# Patient Record
Sex: MALE | Race: BLACK OR AFRICAN AMERICAN | NOT HISPANIC OR LATINO | URBAN - METROPOLITAN AREA
[De-identification: names, ages, dates, MRNs, and addresses within clinical notes are randomized per-mention and may not be internally consistent; named-entity substitution may affect disease eponyms.]

---

## 2022-07-02 ENCOUNTER — EMERGENCY (EMERGENCY)
Facility: HOSPITAL | Age: 32
LOS: 0 days | Discharge: HOME | End: 2022-07-03
Attending: STUDENT IN AN ORGANIZED HEALTH CARE EDUCATION/TRAINING PROGRAM

## 2022-07-02 VITALS
SYSTOLIC BLOOD PRESSURE: 133 MMHG | OXYGEN SATURATION: 97 % | HEIGHT: 72 IN | WEIGHT: 203.93 LBS | RESPIRATION RATE: 17 BRPM | TEMPERATURE: 99 F | HEART RATE: 75 BPM | DIASTOLIC BLOOD PRESSURE: 81 MMHG

## 2022-07-02 DIAGNOSIS — R20.2 PARESTHESIA OF SKIN: ICD-10-CM

## 2022-07-02 DIAGNOSIS — M54.50 LOW BACK PAIN, UNSPECIFIED: ICD-10-CM

## 2022-07-02 DIAGNOSIS — R26.2 DIFFICULTY IN WALKING, NOT ELSEWHERE CLASSIFIED: ICD-10-CM

## 2022-07-02 PROCEDURE — 99053 MED SERV 10PM-8AM 24 HR FAC: CPT

## 2022-07-02 PROCEDURE — 99285 EMERGENCY DEPT VISIT HI MDM: CPT

## 2022-07-03 VITALS
TEMPERATURE: 98 F | HEART RATE: 58 BPM | DIASTOLIC BLOOD PRESSURE: 84 MMHG | RESPIRATION RATE: 19 BRPM | SYSTOLIC BLOOD PRESSURE: 135 MMHG | OXYGEN SATURATION: 100 %

## 2022-07-03 PROCEDURE — 72131 CT LUMBAR SPINE W/O DYE: CPT | Mod: 26,MC

## 2022-07-03 PROCEDURE — 72128 CT CHEST SPINE W/O DYE: CPT | Mod: 26,MC

## 2022-07-03 RX ORDER — MORPHINE SULFATE 50 MG/1
4 CAPSULE, EXTENDED RELEASE ORAL ONCE
Refills: 0 | Status: DISCONTINUED | OUTPATIENT
Start: 2022-07-03 | End: 2022-07-03

## 2022-07-03 RX ORDER — METHOCARBAMOL 500 MG/1
750 TABLET, FILM COATED ORAL ONCE
Refills: 0 | Status: COMPLETED | OUTPATIENT
Start: 2022-07-03 | End: 2022-07-03

## 2022-07-03 RX ORDER — DEXAMETHASONE 0.5 MG/5ML
6 ELIXIR ORAL ONCE
Refills: 0 | Status: COMPLETED | OUTPATIENT
Start: 2022-07-03 | End: 2022-07-03

## 2022-07-03 RX ORDER — IBUPROFEN 200 MG
1 TABLET ORAL
Qty: 30 | Refills: 0
Start: 2022-07-03 | End: 2022-07-07

## 2022-07-03 RX ORDER — LIDOCAINE 4 G/100G
1 CREAM TOPICAL ONCE
Refills: 0 | Status: COMPLETED | OUTPATIENT
Start: 2022-07-03 | End: 2022-07-03

## 2022-07-03 RX ORDER — KETOROLAC TROMETHAMINE 30 MG/ML
15 SYRINGE (ML) INJECTION ONCE
Refills: 0 | Status: DISCONTINUED | OUTPATIENT
Start: 2022-07-03 | End: 2022-07-03

## 2022-07-03 RX ORDER — METHOCARBAMOL 500 MG/1
1 TABLET, FILM COATED ORAL
Qty: 15 | Refills: 0
Start: 2022-07-03 | End: 2022-07-07

## 2022-07-03 RX ORDER — DIAZEPAM 5 MG
5 TABLET ORAL ONCE
Refills: 0 | Status: DISCONTINUED | OUTPATIENT
Start: 2022-07-03 | End: 2022-07-03

## 2022-07-03 RX ADMIN — LIDOCAINE 1 PATCH: 4 CREAM TOPICAL at 03:02

## 2022-07-03 RX ADMIN — Medication 15 MILLIGRAM(S): at 06:45

## 2022-07-03 RX ADMIN — MORPHINE SULFATE 4 MILLIGRAM(S): 50 CAPSULE, EXTENDED RELEASE ORAL at 04:29

## 2022-07-03 RX ADMIN — METHOCARBAMOL 750 MILLIGRAM(S): 500 TABLET, FILM COATED ORAL at 02:59

## 2022-07-03 RX ADMIN — Medication 15 MILLIGRAM(S): at 02:56

## 2022-07-03 RX ADMIN — MORPHINE SULFATE 4 MILLIGRAM(S): 50 CAPSULE, EXTENDED RELEASE ORAL at 06:45

## 2022-07-03 RX ADMIN — Medication 5 MILLIGRAM(S): at 06:55

## 2022-07-03 RX ADMIN — Medication 6 MILLIGRAM(S): at 02:57

## 2022-07-03 NOTE — ED ADULT NURSE REASSESSMENT NOTE - NS ED NURSE REASSESS COMMENT FT1
See Scanned Documents.  
patient verbalized feeling way better, able to stand up and walk small steps. dc instructions explained, patient denies driving, significant other at bedside

## 2022-07-03 NOTE — ED PROVIDER NOTE - PATIENT PORTAL LINK FT
You can access the FollowMyHealth Patient Portal offered by [facility] by registering at the following website: [email or web address]. By joining [employer]’s FollowMyHealth portal, you will also be able to view your health information using other applications (apps) compatible with our system.

## 2022-07-03 NOTE — ED ADULT NURSE NOTE - OBJECTIVE STATEMENT
covering for primary RN Patti; Patient presents to ED awake, alert and oriented x4 c/o back pain since this evening. Patient unable to lay on back r/t pain. hx of back injury. covering for primary RN Patti; Patient presents to ED awake, alert and oriented x4 c/o back pain since this evening. Patient unable to lay on back r/t pain. hx of back injury. Heat pack and ice pack given, education provided.

## 2022-07-03 NOTE — ED PROVIDER NOTE - CLINICAL SUMMARY MEDICAL DECISION MAKING FREE TEXT BOX
31M hx known herniated discs pw severe low back pain, unable to stand. af, vss. well appearing, in nad. plan: ct t/l spine +/- MR. pain control. re-eval. 31M hx known herniated discs pw severe low back pain, unable to stand. AF, VSS. Well appearing, in NAD. Plan: CT T/L spine +/- MR. pain control. Re-eval.

## 2022-07-03 NOTE — ED PROVIDER NOTE - OBJECTIVE STATEMENT
31M known herniated discs mid and lower back s/p work place injury 3yrs ago pw severe lower back pain, unable to stand since 3pm. Pt reports onset w/ get up from laying, no trauma / fall / no lifting, worsening, worst 430p. Pt took Naproxen / Flexeril w/o relief. Pt reports EMS had to carry him, unable to walk. Pt reports felt tingling in toes w/ EMS lifting him onto stretcher. Pt feels he can't urinate in ED. No incontinence.       PMH none, PSH shoulder, NKDA, no meds. 31M known herniated discs mid and lower back s/p work place injury 3yrs ago pw severe lower back pain, unable to stand since 3pm. Pt reports onset w/ get up from laying, no trauma / fall / no lifting, worsening, worst 430p. Pt took Naproxen / Flexeril w/o relief. Pt reports EMS had to carry him, unable to walk. Pt reports felt tingling in toes w/ EMS lifting him onto stretcher. Pt feels he can't urinate in ED. No incontinence.     PMH none, PSH shoulder, NKDA, no meds.

## 2022-07-03 NOTE — ED PROVIDER NOTE - PROGRESS NOTE DETAILS
Able to ambulate CT imaging w/o acute pathology. On re-eval, pt able to ambulate, ambulate to urinate. Stable for d/c home. Given scripts Motrin, Robaxin. Given and recommend outpatient Spinal, PCP f/u, PT. Return signs / symptoms d/w pt. He understands / agrees w/ this plan.

## 2025-02-07 NOTE — ED PROVIDER NOTE - CARE PROVIDER_API CALL
Colonoscopy, 1/24/2025, Dr. Espitia - Internal hemorrhoids. - Diverticulosis in the left colon. - 1 small, 4-6 mm, polyp in the ascending colon, resected and retrieved. - Tortuous colon. - Repeat colonoscopy in 5 years for surveillance based on pathology results, per Dr. Espitia. . Colonoscopy pathology report, 1/24/2025 - Ascending colon polyp; benign mucosal polyps, negative for dysplasia or malignancy. . Colonoscopy, 5/12/2016, Dr. Espitia - 1 diminutive polyp in the rectosigmoid colon, resected and retrieved. - Internal hemorrhoids. . Colonoscopy pathology report, 5/12/2016 - Sigmoid colon polyp; hyperplastic polyp.
Aquiles Smith (DO)  Orthopaedic Surgery Surgery  [address] [facility], [address] [place] [address]  Phone: [phone]  Fax: [phone]  Follow Up Time: Routine